# Patient Record
Sex: FEMALE | Race: OTHER | Employment: FULL TIME | URBAN - METROPOLITAN AREA
[De-identification: names, ages, dates, MRNs, and addresses within clinical notes are randomized per-mention and may not be internally consistent; named-entity substitution may affect disease eponyms.]

---

## 2019-02-11 LAB
ANTIBODY SCREEN, EXTERNAL: NEGATIVE
CHLAMYDIA, EXTERNAL: NEGATIVE
HBSAG, EXTERNAL: NEGATIVE
HIV, EXTERNAL: NEGATIVE
N. GONORRHEA, EXTERNAL: NEGATIVE
RPR, EXTERNAL: NON REACTIVE
RUBELLA, EXTERNAL: NORMAL
TYPE, ABO & RH, EXTERNAL: NORMAL

## 2019-09-03 LAB — GRBS, EXTERNAL: NEGATIVE

## 2019-09-24 ENCOUNTER — HOSPITAL ENCOUNTER (INPATIENT)
Age: 29
LOS: 4 days | Discharge: HOME | End: 2019-09-28
Attending: OBSTETRICS & GYNECOLOGY | Admitting: OBSTETRICS & GYNECOLOGY
Payer: COMMERCIAL

## 2019-09-24 ENCOUNTER — ANESTHESIA (OUTPATIENT)
Dept: LABOR AND DELIVERY | Age: 29
End: 2019-09-24
Payer: COMMERCIAL

## 2019-09-24 ENCOUNTER — ANESTHESIA EVENT (OUTPATIENT)
Dept: LABOR AND DELIVERY | Age: 29
End: 2019-09-24
Payer: COMMERCIAL

## 2019-09-24 PROBLEM — Z34.90 NORMAL IUP (INTRAUTERINE PREGNANCY) ON PRENATAL ULTRASOUND: Status: ACTIVE | Noted: 2019-09-24

## 2019-09-24 LAB
ABO + RH BLD: NORMAL
BASOPHILS # BLD: 0 K/UL (ref 0–0.1)
BASOPHILS NFR BLD: 0 % (ref 0–2)
BLOOD GROUP ANTIBODIES SERPL: NORMAL
DIFFERENTIAL METHOD BLD: ABNORMAL
EOSINOPHIL # BLD: 0.1 K/UL (ref 0–0.4)
EOSINOPHIL NFR BLD: 1 % (ref 0–5)
ERYTHROCYTE [DISTWIDTH] IN BLOOD BY AUTOMATED COUNT: 14.6 % (ref 11.6–14.5)
HCT VFR BLD AUTO: 38.2 % (ref 35–45)
HGB BLD-MCNC: 12.6 G/DL (ref 12–16)
LYMPHOCYTES # BLD: 2.2 K/UL (ref 0.9–3.6)
LYMPHOCYTES NFR BLD: 22 % (ref 21–52)
MCH RBC QN AUTO: 27.6 PG (ref 24–34)
MCHC RBC AUTO-ENTMCNC: 33 G/DL (ref 31–37)
MCV RBC AUTO: 83.6 FL (ref 74–97)
MONOCYTES # BLD: 0.6 K/UL (ref 0.05–1.2)
MONOCYTES NFR BLD: 6 % (ref 3–10)
NEUTS SEG # BLD: 7.1 K/UL (ref 1.8–8)
NEUTS SEG NFR BLD: 71 % (ref 40–73)
PLATELET # BLD AUTO: 228 K/UL (ref 135–420)
PMV BLD AUTO: 10.7 FL (ref 9.2–11.8)
RBC # BLD AUTO: 4.57 M/UL (ref 4.2–5.3)
SPECIMEN EXP DATE BLD: NORMAL
WBC # BLD AUTO: 9.9 K/UL (ref 4.6–13.2)

## 2019-09-24 PROCEDURE — 74011250636 HC RX REV CODE- 250/636

## 2019-09-24 PROCEDURE — 85025 COMPLETE CBC W/AUTO DIFF WBC: CPT

## 2019-09-24 PROCEDURE — 77030034849

## 2019-09-24 PROCEDURE — 75410000002 HC LABOR FEE PER 1 HR

## 2019-09-24 PROCEDURE — 65270000029 HC RM PRIVATE

## 2019-09-24 PROCEDURE — 4A1H74Z MONITORING OF PRODUCTS OF CONCEPTION, CARDIAC ELECTRICAL ACTIVITY, VIA NATURAL OR ARTIFICIAL OPENING: ICD-10-PCS | Performed by: OBSTETRICS & GYNECOLOGY

## 2019-09-24 PROCEDURE — 74011000250 HC RX REV CODE- 250

## 2019-09-24 PROCEDURE — 77030007879 HC KT SPN EPDRL TELE -B: Performed by: ANESTHESIOLOGY

## 2019-09-24 PROCEDURE — 74011250636 HC RX REV CODE- 250/636: Performed by: ANESTHESIOLOGY

## 2019-09-24 PROCEDURE — 86900 BLOOD TYPING SEROLOGIC ABO: CPT

## 2019-09-24 PROCEDURE — 74011000250 HC RX REV CODE- 250: Performed by: ANESTHESIOLOGY

## 2019-09-24 PROCEDURE — 74011250636 HC RX REV CODE- 250/636: Performed by: MIDWIFE

## 2019-09-24 RX ORDER — SODIUM CHLORIDE 0.9 % (FLUSH) 0.9 %
5-40 SYRINGE (ML) INJECTION AS NEEDED
Status: DISCONTINUED | OUTPATIENT
Start: 2019-09-24 | End: 2019-09-25 | Stop reason: HOSPADM

## 2019-09-24 RX ORDER — METHYLERGONOVINE MALEATE 0.2 MG/ML
0.2 INJECTION INTRAVENOUS AS NEEDED
Status: COMPLETED | OUTPATIENT
Start: 2019-09-24 | End: 2019-09-25

## 2019-09-24 RX ORDER — FENTANYL CITRATE 50 UG/ML
100 INJECTION, SOLUTION INTRAMUSCULAR; INTRAVENOUS ONCE
Status: COMPLETED | OUTPATIENT
Start: 2019-09-24 | End: 2019-09-24

## 2019-09-24 RX ORDER — ONDANSETRON 2 MG/ML
INJECTION INTRAMUSCULAR; INTRAVENOUS
Status: COMPLETED
Start: 2019-09-24 | End: 2019-09-24

## 2019-09-24 RX ORDER — BUTORPHANOL TARTRATE 2 MG/ML
2 INJECTION INTRAMUSCULAR; INTRAVENOUS
Status: DISCONTINUED | OUTPATIENT
Start: 2019-09-24 | End: 2019-09-25 | Stop reason: HOSPADM

## 2019-09-24 RX ORDER — NALBUPHINE HYDROCHLORIDE 10 MG/ML
10 INJECTION, SOLUTION INTRAMUSCULAR; INTRAVENOUS; SUBCUTANEOUS
Status: DISCONTINUED | OUTPATIENT
Start: 2019-09-24 | End: 2019-09-25 | Stop reason: HOSPADM

## 2019-09-24 RX ORDER — OXYTOCIN/0.9 % SODIUM CHLORIDE 30/500 ML
0-20 PLASTIC BAG, INJECTION (ML) INTRAVENOUS
Status: DISCONTINUED | OUTPATIENT
Start: 2019-09-24 | End: 2019-09-24

## 2019-09-24 RX ORDER — LIDOCAINE HYDROCHLORIDE 10 MG/ML
20 INJECTION, SOLUTION EPIDURAL; INFILTRATION; INTRACAUDAL; PERINEURAL AS NEEDED
Status: DISCONTINUED | OUTPATIENT
Start: 2019-09-24 | End: 2019-09-25 | Stop reason: HOSPADM

## 2019-09-24 RX ORDER — ONDANSETRON 2 MG/ML
4 INJECTION INTRAMUSCULAR; INTRAVENOUS
Status: DISCONTINUED | OUTPATIENT
Start: 2019-09-24 | End: 2019-09-25

## 2019-09-24 RX ORDER — OXYTOCIN/0.9 % SODIUM CHLORIDE 20/1000 ML
999 PLASTIC BAG, INJECTION (ML) INTRAVENOUS ONCE
Status: ACTIVE | OUTPATIENT
Start: 2019-09-24 | End: 2019-09-24

## 2019-09-24 RX ORDER — PHENYLEPHRINE HCL IN 0.9% NACL 1 MG/10 ML
80 SYRINGE (ML) INTRAVENOUS AS NEEDED
Status: DISCONTINUED | OUTPATIENT
Start: 2019-09-24 | End: 2019-09-25 | Stop reason: HOSPADM

## 2019-09-24 RX ORDER — TERBUTALINE SULFATE 1 MG/ML
0.25 INJECTION SUBCUTANEOUS
Status: DISCONTINUED | OUTPATIENT
Start: 2019-09-24 | End: 2019-09-25 | Stop reason: HOSPADM

## 2019-09-24 RX ORDER — NALBUPHINE HYDROCHLORIDE 10 MG/ML
2.5 INJECTION, SOLUTION INTRAMUSCULAR; INTRAVENOUS; SUBCUTANEOUS
Status: DISCONTINUED | OUTPATIENT
Start: 2019-09-24 | End: 2019-09-25 | Stop reason: HOSPADM

## 2019-09-24 RX ORDER — SODIUM CHLORIDE, SODIUM LACTATE, POTASSIUM CHLORIDE, CALCIUM CHLORIDE 600; 310; 30; 20 MG/100ML; MG/100ML; MG/100ML; MG/100ML
125 INJECTION, SOLUTION INTRAVENOUS CONTINUOUS
Status: DISCONTINUED | OUTPATIENT
Start: 2019-09-24 | End: 2019-09-25 | Stop reason: HOSPADM

## 2019-09-24 RX ORDER — OXYTOCIN/0.9 % SODIUM CHLORIDE 20/1000 ML
125 PLASTIC BAG, INJECTION (ML) INTRAVENOUS CONTINUOUS
Status: DISCONTINUED | OUTPATIENT
Start: 2019-09-24 | End: 2019-09-25 | Stop reason: HOSPADM

## 2019-09-24 RX ORDER — DIPHENHYDRAMINE HYDROCHLORIDE 50 MG/ML
25 INJECTION, SOLUTION INTRAMUSCULAR; INTRAVENOUS
Status: DISCONTINUED | OUTPATIENT
Start: 2019-09-24 | End: 2019-09-25 | Stop reason: HOSPADM

## 2019-09-24 RX ORDER — FENTANYL CITRATE 50 UG/ML
INJECTION, SOLUTION INTRAMUSCULAR; INTRAVENOUS AS NEEDED
Status: DISCONTINUED | OUTPATIENT
Start: 2019-09-24 | End: 2019-09-25 | Stop reason: HOSPADM

## 2019-09-24 RX ORDER — RANITIDINE 150 MG/1
150 TABLET, FILM COATED ORAL 2 TIMES DAILY
COMMUNITY

## 2019-09-24 RX ORDER — MINERAL OIL
30 OIL (ML) ORAL AS NEEDED
Status: DISCONTINUED | OUTPATIENT
Start: 2019-09-24 | End: 2019-09-25 | Stop reason: HOSPADM

## 2019-09-24 RX ORDER — LIDOCAINE HYDROCHLORIDE AND EPINEPHRINE 15; 5 MG/ML; UG/ML
INJECTION, SOLUTION EPIDURAL AS NEEDED
Status: DISCONTINUED | OUTPATIENT
Start: 2019-09-24 | End: 2019-09-25 | Stop reason: HOSPADM

## 2019-09-24 RX ORDER — FENTANYL/ROPIVACAINE/NS/PF 2MCG/ML-.1
1-22 PLASTIC BAG, INJECTION (ML) EPIDURAL
Status: DISCONTINUED | OUTPATIENT
Start: 2019-09-24 | End: 2019-09-25 | Stop reason: HOSPADM

## 2019-09-24 RX ORDER — SODIUM CHLORIDE 0.9 % (FLUSH) 0.9 %
5-40 SYRINGE (ML) INJECTION EVERY 8 HOURS
Status: DISCONTINUED | OUTPATIENT
Start: 2019-09-24 | End: 2019-09-25 | Stop reason: HOSPADM

## 2019-09-24 RX ORDER — NALOXONE HYDROCHLORIDE 0.4 MG/ML
0.2 INJECTION, SOLUTION INTRAMUSCULAR; INTRAVENOUS; SUBCUTANEOUS AS NEEDED
Status: DISCONTINUED | OUTPATIENT
Start: 2019-09-24 | End: 2019-09-25 | Stop reason: HOSPADM

## 2019-09-24 RX ORDER — OXYTOCIN/0.9 % SODIUM CHLORIDE 30/500 ML
0-20 PLASTIC BAG, INJECTION (ML) INTRAVENOUS
Status: DISCONTINUED | OUTPATIENT
Start: 2019-09-24 | End: 2019-09-25

## 2019-09-24 RX ADMIN — SODIUM CHLORIDE, SODIUM LACTATE, POTASSIUM CHLORIDE, AND CALCIUM CHLORIDE 125 ML/HR: 600; 310; 30; 20 INJECTION, SOLUTION INTRAVENOUS at 09:50

## 2019-09-24 RX ADMIN — SODIUM CHLORIDE, SODIUM LACTATE, POTASSIUM CHLORIDE, AND CALCIUM CHLORIDE 1000 ML: 600; 310; 30; 20 INJECTION, SOLUTION INTRAVENOUS at 12:42

## 2019-09-24 RX ADMIN — ONDANSETRON 4 MG: 2 INJECTION INTRAMUSCULAR; INTRAVENOUS at 17:39

## 2019-09-24 RX ADMIN — Medication 2 MILLI-UNITS/MIN: at 10:15

## 2019-09-24 RX ADMIN — Medication 6 MILLI-UNITS/MIN: at 11:27

## 2019-09-24 RX ADMIN — LIDOCAINE HYDROCHLORIDE AND EPINEPHRINE 5 ML: 15; 5 INJECTION, SOLUTION EPIDURAL at 14:06

## 2019-09-24 RX ADMIN — ROPIVACAINE HYDROCHLORIDE 15 ML/HR: 10 INJECTION, SOLUTION EPIDURAL at 12:52

## 2019-09-24 RX ADMIN — FENTANYL CITRATE 100 MCG: 50 INJECTION, SOLUTION INTRAMUSCULAR; INTRAVENOUS at 14:07

## 2019-09-24 RX ADMIN — FENTANYL CITRATE 100 MCG: 50 INJECTION, SOLUTION INTRAMUSCULAR; INTRAVENOUS at 12:49

## 2019-09-24 RX ADMIN — SODIUM CHLORIDE, SODIUM LACTATE, POTASSIUM CHLORIDE, AND CALCIUM CHLORIDE 125 ML/HR: 600; 310; 30; 20 INJECTION, SOLUTION INTRAVENOUS at 23:07

## 2019-09-24 RX ADMIN — SODIUM CHLORIDE, SODIUM LACTATE, POTASSIUM CHLORIDE, AND CALCIUM CHLORIDE 500 ML: 600; 310; 30; 20 INJECTION, SOLUTION INTRAVENOUS at 23:11

## 2019-09-24 RX ADMIN — LIDOCAINE HYDROCHLORIDE AND EPINEPHRINE 5 ML: 15; 5 INJECTION, SOLUTION EPIDURAL at 12:45

## 2019-09-24 NOTE — ANESTHESIA PREPROCEDURE EVALUATION
Relevant Problems   No relevant active problems       Anesthetic History               Review of Systems / Medical History  Patient summary reviewed, nursing notes reviewed and pertinent labs reviewed    Pulmonary            Asthma        Neuro/Psych   Within defined limits           Cardiovascular                  Exercise tolerance: >4 METS     GI/Hepatic/Renal  Within defined limits              Endo/Other  Within defined limits           Other Findings              Physical Exam    Airway  Mallampati: II  TM Distance: 4 - 6 cm  Neck ROM: normal range of motion   Mouth opening: Normal     Cardiovascular  Regular rate and rhythm,  S1 and S2 normal,  no murmur, click, rub, or gallop             Dental  No notable dental hx       Pulmonary  Breath sounds clear to auscultation               Abdominal  GI exam deferred       Other Findings            Anesthetic Plan    ASA: 3  Anesthesia type: epidural  Risk and benefits fully explained to the patient including bleeding, headache, nerve damage, infection, nausea, back pain, and hemodynamic changes. Patient understands and agrees to the procedure. Post-op pain plan if not by surgeon: indwelling epidural catheter      Anesthetic plan and risks discussed with: Patient      Dr. Rima Mccauley called c/s. Patient seen and ready for surgery. SS Morphine discussed. Will use epidural for c/s.

## 2019-09-24 NOTE — PROGRESS NOTES
presents at 39.0 weeks gestation for induction due to suspected LGA infant. EFM applied. Hx taken. Pt oriented to room. Consents signed.

## 2019-09-24 NOTE — PROGRESS NOTES
09/24/19 1346 09/24/19 1351   Maternal Vital Signs   O2 Sat (%) 95 % 99 %   Pulse via Oximetry 86 beats per minute 104 beats per minute   BP  --  (!) 69/34   MAP (Calculated)  --  (!) 46     Dr. Kelly Beard called to pt's bedside.

## 2019-09-24 NOTE — ANESTHESIA PROCEDURE NOTES
Epidural Block    Start time: 9/24/2019 12:46 PM  End time: 9/24/2019 12:51 PM  Performed by: Berkley Savage MD  Authorized by:  Berkley Savage MD     Pre-Procedure  Indication: at surgeon's request, post-op pain management, procedure for pain and labor epidural    Preanesthetic Checklist: patient identified, risks and benefits discussed, anesthesia consent, site marked, patient being monitored, timeout performed and anesthesia consent      Epidural:   Patient position:  Seated  Prep region:  Lumbar  Prep: Chlorhexidine    Location:  L3-4    Needle and Epidural Catheter:   Needle Type:  Tuohy  Needle Gauge:  17 G  Injection Technique:  Loss of resistance using saline  Attempts:  1  Catheter Size:  20 G  Catheter at Skin Depth (cm):  12  Depth in Epidural Space (cm):  5  Events: no blood with aspiration, no cerebrospinal fluid with aspiration, no paresthesia and negative aspiration test    Test Dose:  Negative    Assessment:   Catheter Secured:  Tegaderm and tape  Insertion:  Uncomplicated  Patient tolerance:  Patient tolerated the procedure well with no immediate complications

## 2019-09-24 NOTE — PROGRESS NOTES
Labor Progress Note  Patient seen, fetal heart rate and contraction pattern evaluated, patient examined. No data found. Physical Exam:  Cervical Exam:  4 cm dilated    50% effaced    -3 station    Membranes:  Intact  Uterine Activity: Frequency: Every 2-5 minutes, Duration: 60-80 seconds and Intensity: moderate  Fetal Heart Rate: Baseline: 125 per minute  Variability: moderate  Accelerations: yes  Decelerations: none    Assessment/Plan:  Reassuring Fetal Status, Cat 1 tracing  Comfortable with INDER; daniels catheter placed without difficulty by this CNM after vaginal exam.  Minimal change in SVE.  Plan to increase pitocin and attempt AROM with next exam.  Anticipate     Kristie Lassiter CNM

## 2019-09-24 NOTE — H&P
History & Physical    Name: Seema Kumari MRN: [id number]  SSN: [id number]    YOB: 1990  Age: 26 y.o. Sex: female        Subjective:     Estimated Date of Delivery: None noted. OB History        1    Para        Term                AB        Living           SAB        TAB        Ectopic        Molar        Multiple        Live Births                      Ms. Jere Carranza is admitted with pregnancy at Unknown for induction of labor. Prenatal course was normal. Please see prenatal records for details. Allergies not on file    Objective:     Vitals: There were no vitals filed for this visit. Physical Exam:  Patient without distress. Abdomen: soft, nontender  Fundus: soft and non tender  Perineum: blood absent, amniotic fluid absent  Cervical Exam: 4 cm dilated    10% effaced    -3 station    presenting part vtx  Membranes:  Intact  Fetal Heart Rate & Contraction pattern: Reactive    Prenatal Labs:   No results found for: RUBELLAEXT, GRBSEXT, HBSAGEXT, HIVEXT, RPREXT, GONNOEXT, CHLAMEXT      Assessment/Plan:     Plan: Admit for Reassuring fetal status. Plan for pitocin/AROM as indicated. INDER/IV pain meds per patient request. Group B Strep was negative.     Signed By:  Pepito Uribe CNM     2019

## 2019-09-24 NOTE — PROGRESS NOTES
Bedside shift change report given to KRISTIE Morrow RN (oncoming nurse) by [employer] (offgoing nurse). Report included the following information SBAR, Intake/Output and MAR.

## 2019-09-24 NOTE — PROGRESS NOTES
Dr. Shaquille Clements at bedside explaining epidural procedure, side effects, risks, benefits, and positioning. Patient verbalizes understanding. Time-out: 1246  Procedure start: 1313  Catheter in, needle out: 1249  Test dose: 1250  Fentanyl vial handed to MD at bedside.   Loading dose: 1251  Patient connected to pump: 1253

## 2019-09-25 PROBLEM — Z34.90 NORMAL IUP (INTRAUTERINE PREGNANCY) ON PRENATAL ULTRASOUND: Status: RESOLVED | Noted: 2019-09-24 | Resolved: 2019-09-25

## 2019-09-25 PROBLEM — E66.01 MORBID OBESITY WITH BMI OF 40.0-44.9, ADULT (HCC): Status: ACTIVE | Noted: 2019-09-25

## 2019-09-25 PROBLEM — Q65.89 CONGENITAL HIP DYSPLASIA: Status: ACTIVE | Noted: 2019-09-25

## 2019-09-25 PROCEDURE — 77030034849

## 2019-09-25 PROCEDURE — 74011250636 HC RX REV CODE- 250/636: Performed by: ADVANCED PRACTICE MIDWIFE

## 2019-09-25 PROCEDURE — 76060000078 HC EPIDURAL ANESTHESIA

## 2019-09-25 PROCEDURE — 74011250636 HC RX REV CODE- 250/636: Performed by: MIDWIFE

## 2019-09-25 PROCEDURE — 74011250637 HC RX REV CODE- 250/637: Performed by: ANESTHESIOLOGY

## 2019-09-25 PROCEDURE — 75410000002 HC LABOR FEE PER 1 HR

## 2019-09-25 PROCEDURE — 74011250636 HC RX REV CODE- 250/636

## 2019-09-25 PROCEDURE — 75410000003 HC RECOV DEL/VAG/CSECN EA 0.5 HR

## 2019-09-25 PROCEDURE — 74011000250 HC RX REV CODE- 250: Performed by: ANESTHESIOLOGY

## 2019-09-25 PROCEDURE — 77030031139 HC SUT VCRL2 J&J -A: Performed by: OBSTETRICS & GYNECOLOGY

## 2019-09-25 PROCEDURE — 77030002933 HC SUT MCRYL J&J -A: Performed by: OBSTETRICS & GYNECOLOGY

## 2019-09-25 PROCEDURE — 74011250636 HC RX REV CODE- 250/636: Performed by: ANESTHESIOLOGY

## 2019-09-25 PROCEDURE — 77030040361 HC SLV COMPR DVT MDII -B

## 2019-09-25 PROCEDURE — 76010000392 HC C SECN EA ADDL 0.5 HR: Performed by: OBSTETRICS & GYNECOLOGY

## 2019-09-25 PROCEDURE — 74011250637 HC RX REV CODE- 250/637

## 2019-09-25 PROCEDURE — 77030002888 HC SUT CHRMC J&J -A: Performed by: OBSTETRICS & GYNECOLOGY

## 2019-09-25 PROCEDURE — 76060000033 HC ANESTHESIA 1 TO 1.5 HR: Performed by: OBSTETRICS & GYNECOLOGY

## 2019-09-25 PROCEDURE — 75410000003 HC RECOV DEL/VAG/CSECN EA 0.5 HR: Performed by: OBSTETRICS & GYNECOLOGY

## 2019-09-25 PROCEDURE — 74011000250 HC RX REV CODE- 250: Performed by: ADVANCED PRACTICE MIDWIFE

## 2019-09-25 PROCEDURE — 65270000029 HC RM PRIVATE

## 2019-09-25 PROCEDURE — 76010000391 HC C SECN FIRST 1 HR: Performed by: OBSTETRICS & GYNECOLOGY

## 2019-09-25 PROCEDURE — 77030040361 HC SLV COMPR DVT MDII -B: Performed by: OBSTETRICS & GYNECOLOGY

## 2019-09-25 RX ORDER — SODIUM CHLORIDE, SODIUM LACTATE, POTASSIUM CHLORIDE, CALCIUM CHLORIDE 600; 310; 30; 20 MG/100ML; MG/100ML; MG/100ML; MG/100ML
100 INJECTION, SOLUTION INTRAVENOUS CONTINUOUS
Status: DISCONTINUED | OUTPATIENT
Start: 2019-09-25 | End: 2019-09-28 | Stop reason: HOSPADM

## 2019-09-25 RX ORDER — ACETAMINOPHEN 325 MG/1
650 TABLET ORAL
Status: DISCONTINUED | OUTPATIENT
Start: 2019-09-25 | End: 2019-09-28 | Stop reason: HOSPADM

## 2019-09-25 RX ORDER — FENTANYL CITRATE 50 UG/ML
25 INJECTION, SOLUTION INTRAMUSCULAR; INTRAVENOUS AS NEEDED
Status: DISCONTINUED | OUTPATIENT
Start: 2019-09-25 | End: 2019-09-28 | Stop reason: HOSPADM

## 2019-09-25 RX ORDER — OXYTOCIN/RINGER'S LACTATE 20/1000 ML
PLASTIC BAG, INJECTION (ML) INTRAVENOUS
Status: DISCONTINUED | OUTPATIENT
Start: 2019-09-25 | End: 2019-09-25 | Stop reason: HOSPADM

## 2019-09-25 RX ORDER — PROMETHAZINE HYDROCHLORIDE 25 MG/ML
25 INJECTION, SOLUTION INTRAMUSCULAR; INTRAVENOUS
Status: DISCONTINUED | OUTPATIENT
Start: 2019-09-25 | End: 2019-09-28 | Stop reason: HOSPADM

## 2019-09-25 RX ORDER — SODIUM CHLORIDE 0.9 % (FLUSH) 0.9 %
5-40 SYRINGE (ML) INJECTION EVERY 8 HOURS
Status: DISCONTINUED | OUTPATIENT
Start: 2019-09-25 | End: 2019-09-28 | Stop reason: HOSPADM

## 2019-09-25 RX ORDER — BUPIVACAINE HYDROCHLORIDE 2.5 MG/ML
INJECTION, SOLUTION EPIDURAL; INFILTRATION; INTRACAUDAL AS NEEDED
Status: DISCONTINUED | OUTPATIENT
Start: 2019-09-25 | End: 2019-09-25 | Stop reason: HOSPADM

## 2019-09-25 RX ORDER — HYDROMORPHONE HYDROCHLORIDE 1 MG/ML
INJECTION, SOLUTION INTRAMUSCULAR; INTRAVENOUS; SUBCUTANEOUS
Status: COMPLETED
Start: 2019-09-25 | End: 2019-09-25

## 2019-09-25 RX ORDER — LORATADINE 10 MG/1
10 TABLET ORAL DAILY PRN
Status: DISCONTINUED | OUTPATIENT
Start: 2019-09-25 | End: 2019-09-28 | Stop reason: HOSPADM

## 2019-09-25 RX ORDER — OXYCODONE HYDROCHLORIDE 5 MG/1
5 TABLET ORAL
Status: DISPENSED | OUTPATIENT
Start: 2019-09-25 | End: 2019-09-26

## 2019-09-25 RX ORDER — ONDANSETRON 2 MG/ML
INJECTION INTRAMUSCULAR; INTRAVENOUS AS NEEDED
Status: DISCONTINUED | OUTPATIENT
Start: 2019-09-25 | End: 2019-09-25 | Stop reason: HOSPADM

## 2019-09-25 RX ORDER — FACIAL-BODY WIPES
10 EACH TOPICAL
Status: DISCONTINUED | OUTPATIENT
Start: 2019-09-25 | End: 2019-09-28 | Stop reason: HOSPADM

## 2019-09-25 RX ORDER — OXYTOCIN/0.9 % SODIUM CHLORIDE 30/500 ML
0-20 PLASTIC BAG, INJECTION (ML) INTRAVENOUS
Status: DISCONTINUED | OUTPATIENT
Start: 2019-09-25 | End: 2019-09-25

## 2019-09-25 RX ORDER — MISOPROSTOL 100 UG/1
TABLET ORAL
Status: COMPLETED
Start: 2019-09-25 | End: 2019-09-25

## 2019-09-25 RX ORDER — CEFAZOLIN SODIUM/WATER 2 G/20 ML
2 SYRINGE (ML) INTRAVENOUS EVERY 8 HOURS
Status: DISCONTINUED | OUTPATIENT
Start: 2019-09-25 | End: 2019-09-25

## 2019-09-25 RX ORDER — SODIUM CHLORIDE, SODIUM LACTATE, POTASSIUM CHLORIDE, CALCIUM CHLORIDE 600; 310; 30; 20 MG/100ML; MG/100ML; MG/100ML; MG/100ML
125 INJECTION, SOLUTION INTRAVENOUS CONTINUOUS
Status: DISPENSED | OUTPATIENT
Start: 2019-09-25 | End: 2019-09-26

## 2019-09-25 RX ORDER — KETOROLAC TROMETHAMINE 30 MG/ML
30 INJECTION, SOLUTION INTRAMUSCULAR; INTRAVENOUS EVERY 6 HOURS
Status: COMPLETED | OUTPATIENT
Start: 2019-09-25 | End: 2019-09-26

## 2019-09-25 RX ORDER — CEFAZOLIN SODIUM IN 0.9 % NACL 2 G/100 ML
PLASTIC BAG, INJECTION (ML) INTRAVENOUS AS NEEDED
Status: DISCONTINUED | OUTPATIENT
Start: 2019-09-25 | End: 2019-09-25 | Stop reason: HOSPADM

## 2019-09-25 RX ORDER — INSULIN LISPRO 100 [IU]/ML
INJECTION, SOLUTION INTRAVENOUS; SUBCUTANEOUS ONCE
Status: CANCELLED | OUTPATIENT
Start: 2019-09-25 | End: 2019-09-25

## 2019-09-25 RX ORDER — MISOPROSTOL 100 UG/1
800 TABLET ORAL ONCE
Status: COMPLETED | OUTPATIENT
Start: 2019-09-25 | End: 2019-09-25

## 2019-09-25 RX ORDER — NALOXONE HYDROCHLORIDE 0.4 MG/ML
0.2 INJECTION, SOLUTION INTRAMUSCULAR; INTRAVENOUS; SUBCUTANEOUS
Status: ACTIVE | OUTPATIENT
Start: 2019-09-25 | End: 2019-09-26

## 2019-09-25 RX ORDER — SODIUM CHLORIDE 0.9 % (FLUSH) 0.9 %
5-40 SYRINGE (ML) INJECTION AS NEEDED
Status: DISCONTINUED | OUTPATIENT
Start: 2019-09-25 | End: 2019-09-25 | Stop reason: SDUPTHER

## 2019-09-25 RX ORDER — SIMETHICONE 80 MG
80 TABLET,CHEWABLE ORAL
Status: DISCONTINUED | OUTPATIENT
Start: 2019-09-25 | End: 2019-09-28 | Stop reason: HOSPADM

## 2019-09-25 RX ORDER — SODIUM CHLORIDE 0.9 % (FLUSH) 0.9 %
5-40 SYRINGE (ML) INJECTION AS NEEDED
Status: DISCONTINUED | OUTPATIENT
Start: 2019-09-25 | End: 2019-09-28 | Stop reason: HOSPADM

## 2019-09-25 RX ORDER — OXYTOCIN/0.9 % SODIUM CHLORIDE 20/1000 ML
125 PLASTIC BAG, INJECTION (ML) INTRAVENOUS CONTINUOUS
Status: DISCONTINUED | OUTPATIENT
Start: 2019-09-25 | End: 2019-09-28 | Stop reason: HOSPADM

## 2019-09-25 RX ORDER — ONDANSETRON 2 MG/ML
4 INJECTION INTRAMUSCULAR; INTRAVENOUS ONCE
Status: ACTIVE | OUTPATIENT
Start: 2019-09-25 | End: 2019-09-25

## 2019-09-25 RX ORDER — MAGNESIUM SULFATE 100 %
4 CRYSTALS MISCELLANEOUS AS NEEDED
Status: CANCELLED | OUTPATIENT
Start: 2019-09-25

## 2019-09-25 RX ORDER — EPHEDRINE SULFATE/0.9% NACL/PF 25 MG/5 ML
SYRINGE (ML) INTRAVENOUS AS NEEDED
Status: DISCONTINUED | OUTPATIENT
Start: 2019-09-25 | End: 2019-09-25 | Stop reason: HOSPADM

## 2019-09-25 RX ORDER — OXYCODONE AND ACETAMINOPHEN 5; 325 MG/1; MG/1
1-2 TABLET ORAL
Status: DISCONTINUED | OUTPATIENT
Start: 2019-09-25 | End: 2019-09-28 | Stop reason: HOSPADM

## 2019-09-25 RX ORDER — NALBUPHINE HYDROCHLORIDE 10 MG/ML
5 INJECTION, SOLUTION INTRAMUSCULAR; INTRAVENOUS; SUBCUTANEOUS
Status: ACTIVE | OUTPATIENT
Start: 2019-09-25 | End: 2019-09-26

## 2019-09-25 RX ORDER — MORPHINE SULFATE 1 MG/ML
INJECTION, SOLUTION EPIDURAL; INTRATHECAL; INTRAVENOUS AS NEEDED
Status: DISCONTINUED | OUTPATIENT
Start: 2019-09-25 | End: 2019-09-25 | Stop reason: HOSPADM

## 2019-09-25 RX ORDER — NALOXONE HYDROCHLORIDE 0.4 MG/ML
0.1 INJECTION, SOLUTION INTRAMUSCULAR; INTRAVENOUS; SUBCUTANEOUS AS NEEDED
Status: DISCONTINUED | OUTPATIENT
Start: 2019-09-25 | End: 2019-09-28 | Stop reason: HOSPADM

## 2019-09-25 RX ORDER — SODIUM CHLORIDE 0.9 % (FLUSH) 0.9 %
5-40 SYRINGE (ML) INJECTION EVERY 8 HOURS
Status: DISCONTINUED | OUTPATIENT
Start: 2019-09-25 | End: 2019-09-25 | Stop reason: SDUPTHER

## 2019-09-25 RX ORDER — IBUPROFEN 400 MG/1
800 TABLET ORAL
Status: DISCONTINUED | OUTPATIENT
Start: 2019-09-28 | End: 2019-09-26

## 2019-09-25 RX ORDER — NALOXONE HYDROCHLORIDE 0.4 MG/ML
0.4 INJECTION, SOLUTION INTRAMUSCULAR; INTRAVENOUS; SUBCUTANEOUS
Status: ACTIVE | OUTPATIENT
Start: 2019-09-25 | End: 2019-09-26

## 2019-09-25 RX ORDER — LIDOCAINE HYDROCHLORIDE AND EPINEPHRINE 20; 5 MG/ML; UG/ML
INJECTION, SOLUTION EPIDURAL; INFILTRATION; INTRACAUDAL; PERINEURAL AS NEEDED
Status: DISCONTINUED | OUTPATIENT
Start: 2019-09-25 | End: 2019-09-25 | Stop reason: HOSPADM

## 2019-09-25 RX ORDER — ONDANSETRON 2 MG/ML
4 INJECTION INTRAMUSCULAR; INTRAVENOUS
Status: DISCONTINUED | OUTPATIENT
Start: 2019-09-25 | End: 2019-09-28 | Stop reason: HOSPADM

## 2019-09-25 RX ORDER — ZOLPIDEM TARTRATE 5 MG/1
5 TABLET ORAL
Status: DISCONTINUED | OUTPATIENT
Start: 2019-09-25 | End: 2019-09-28 | Stop reason: HOSPADM

## 2019-09-25 RX ORDER — HYDROMORPHONE HYDROCHLORIDE 1 MG/ML
0.5 INJECTION, SOLUTION INTRAMUSCULAR; INTRAVENOUS; SUBCUTANEOUS
Status: DISCONTINUED | OUTPATIENT
Start: 2019-09-25 | End: 2019-09-28 | Stop reason: HOSPADM

## 2019-09-25 RX ORDER — DIPHENHYDRAMINE HYDROCHLORIDE 50 MG/ML
25 INJECTION, SOLUTION INTRAMUSCULAR; INTRAVENOUS
Status: DISCONTINUED | OUTPATIENT
Start: 2019-09-25 | End: 2019-09-28 | Stop reason: HOSPADM

## 2019-09-25 RX ORDER — CEFAZOLIN SODIUM/WATER 2 G/20 ML
2 SYRINGE (ML) INTRAVENOUS ONCE
Status: DISCONTINUED | OUTPATIENT
Start: 2019-09-25 | End: 2019-09-25

## 2019-09-25 RX ADMIN — KETOROLAC TROMETHAMINE 30 MG: 30 INJECTION, SOLUTION INTRAMUSCULAR at 17:40

## 2019-09-25 RX ADMIN — ROPIVACAINE HYDROCHLORIDE 12 ML/HR: 10 INJECTION, SOLUTION EPIDURAL at 06:22

## 2019-09-25 RX ADMIN — Medication 5 MG: at 11:05

## 2019-09-25 RX ADMIN — ONDANSETRON 4 MG: 2 INJECTION INTRAMUSCULAR; INTRAVENOUS at 10:22

## 2019-09-25 RX ADMIN — LIDOCAINE HYDROCHLORIDE AND EPINEPHRINE 5 ML: 20; 5 INJECTION, SOLUTION EPIDURAL; INFILTRATION; INTRACAUDAL; PERINEURAL at 10:07

## 2019-09-25 RX ADMIN — OXYCODONE HYDROCHLORIDE 5 MG: 5 TABLET ORAL at 22:20

## 2019-09-25 RX ADMIN — Medication 2000 ML/HR: at 10:41

## 2019-09-25 RX ADMIN — SODIUM CHLORIDE, SODIUM LACTATE, POTASSIUM CHLORIDE, AND CALCIUM CHLORIDE 300 ML: 600; 310; 30; 20 INJECTION, SOLUTION INTRAVENOUS at 09:08

## 2019-09-25 RX ADMIN — SODIUM CHLORIDE, SODIUM LACTATE, POTASSIUM CHLORIDE, AND CALCIUM CHLORIDE 125 ML/HR: 600; 310; 30; 20 INJECTION, SOLUTION INTRAVENOUS at 03:44

## 2019-09-25 RX ADMIN — ONDANSETRON 4 MG: 2 INJECTION INTRAMUSCULAR; INTRAVENOUS at 16:24

## 2019-09-25 RX ADMIN — MORPHINE SULFATE 4 MG: 1 INJECTION, SOLUTION EPIDURAL; INTRATHECAL; INTRAVENOUS at 11:21

## 2019-09-25 RX ADMIN — LIDOCAINE HYDROCHLORIDE AND EPINEPHRINE 5 ML: 20; 5 INJECTION, SOLUTION EPIDURAL; INFILTRATION; INTRACAUDAL; PERINEURAL at 09:46

## 2019-09-25 RX ADMIN — BUPIVACAINE HYDROCHLORIDE 6 ML: 2.5 INJECTION, SOLUTION EPIDURAL; INFILTRATION; INTRACAUDAL at 08:16

## 2019-09-25 RX ADMIN — LORATADINE 10 MG: 10 TABLET ORAL at 16:00

## 2019-09-25 RX ADMIN — FAMOTIDINE 20 MG: 10 INJECTION, SOLUTION INTRAVENOUS at 09:07

## 2019-09-25 RX ADMIN — MISOPROSTOL 800 MCG: 100 TABLET ORAL at 11:24

## 2019-09-25 RX ADMIN — LIDOCAINE HYDROCHLORIDE AND EPINEPHRINE 5 ML: 20; 5 INJECTION, SOLUTION EPIDURAL; INFILTRATION; INTRACAUDAL; PERINEURAL at 10:16

## 2019-09-25 RX ADMIN — METHYLERGONOVINE MALEATE 0.2 MCG: 0.2 INJECTION, SOLUTION INTRAMUSCULAR; INTRAVENOUS at 10:44

## 2019-09-25 RX ADMIN — HYDROMORPHONE HYDROCHLORIDE 0.5 MG: 1 INJECTION, SOLUTION INTRAMUSCULAR; INTRAVENOUS; SUBCUTANEOUS at 12:10

## 2019-09-25 RX ADMIN — Medication 2 G: at 10:17

## 2019-09-25 RX ADMIN — ROPIVACAINE HYDROCHLORIDE 12 ML/HR: 10 INJECTION, SOLUTION EPIDURAL at 00:26

## 2019-09-25 RX ADMIN — HYDROMORPHONE HYDROCHLORIDE 0.5 MG: 1 INJECTION, SOLUTION INTRAMUSCULAR; INTRAVENOUS; SUBCUTANEOUS at 15:50

## 2019-09-25 RX ADMIN — Medication 10 MG: at 11:02

## 2019-09-25 RX ADMIN — Medication 18 MILLI-UNITS/MIN: at 07:16

## 2019-09-25 RX ADMIN — Medication 200 ML/HR: at 11:12

## 2019-09-25 RX ADMIN — KETOROLAC TROMETHAMINE 30 MG: 30 INJECTION, SOLUTION INTRAMUSCULAR at 12:09

## 2019-09-25 RX ADMIN — Medication 2 G: at 10:00

## 2019-09-25 NOTE — PROGRESS NOTES
Labor Progress Note  Patient seen sitting upright in bed with INDER in place. IV Pitocin infusing at rate of 18 mu/min, fetal heart rate and contraction pattern evaluated, patient examined. Patient Vitals for the past 1 hrs:   BP Temp Pulse Resp SpO2   09/24/19 2019 -- -- -- -- 94 %   09/24/19 2016 (!) 89/70 98.6 °F (37 °C) 96 16 --       Physical Exam:  Cervical Exam:  4-5 cm dilated    50% effaced    -3 station , ballotable   Presenting Part: cephalic  Cervical Position: posterior  Consistency: Soft  Membranes:  Intact  Uterine Activity: Frequency: Every 1-3 minutes and Intensity: moderate  Fetal Heart Rate: Reactive, Category 1  Baseline: 120 per minute  Variability: moderate  Accelerations: yes  Decelerations: none    Assessment/Plan:  Reassuring fetal status, Labor  Progressing, Continue labor management and safe titration of IV pitocin as ordered. Plan ROM when fetal head is engaged. Encourage maternal change in position to assist with labor progression.  Anticipate plan for vaginal delivery

## 2019-09-25 NOTE — PROGRESS NOTES
Bedside and Verbal shift change report given to KRISTIE Morrow RN (oncoming nurse) by Bridgett Henry (offgoing nurse). Report included the following information SBAR, Kardex, Procedure Summary, Intake/Output, MAR and Recent Results. 2000 Pt assisted to high velazquez's position. 2030 JODIE Michael CNM at bedside. SVE 5/50/ballotable    2048 Pt is feeling more pain. Epidural rate has been reduced from 15 to 10 lisa-units due to hypotension. CRNA paged to see if we can increase epidural rate back to 15.     2054 Anesthesiologist paged with quick return call. Informed him of information above. Orders received to change order to 12/4/15/4.     2100 Upon changing the pump RN noticed that Epidural got disconnected at the filter side. Dr. Jake Bond paged and came to bedside. Filter taken off and blue part replaced before epidural reconnected. Pt in high velazquez's position. 2130 Pt assisted to left lateral side with leg in stirrup. 26 Pt assisted to right lateral side with leg in stirrup. [place]i readjusted. 2240 [place] readjusted. 200 Pt assisted to high velazquez's position. PT c/o nausea, light headedness. Late deceleration noted. 2312 PT assisted to left lateral side. Sheridan Card on unit. Informed her of deceleration, drop in BP, contractions that are spreading out. Sheridan Card CNM to come to bedside to check SVE.     2335 JODIE Michael RN at bedside. SVE  Unchanged. Orders received to stop Pitocin for 2 hours and restart at 4 lisa-units. Pt may eat a light meal at this time. 0130 Pt assisted to right lateral side. 0230 Pt sleeping on right lateral side. 1066 PT assisted to left lateral side with peanut ball between legs. 36 JODIE Michael CNM at bedside. SVE 5/50/-3 ballotable  . Baby continues to be balotable. CNM to consult with Dr. Damion Huynh about POC.     0744 Plan to continue with Pitocin.      0710 Bedside and Verbal shift change report given to Bridgett Henry (oncoming nurse) by King Orozco (offgoing nurse). Report included the following information SBAR, Kardex, Procedure Summary, Intake/Output, MAR and Recent Results.

## 2019-09-25 NOTE — PROGRESS NOTES
C/w cnm  Craigen Brault, 9 hrs + arrest of active labor, non descent, cvx still 5 cm  Over 9 hrs of pit augmentation, arm w/o change and vtx at -3station. , c/w couple all in concurence for LTCS delivery. Declines further labor  Manipulations. 1 hr called c section. Note late decelerations with last pit activity  Stopped once pit dced and labor decreased.

## 2019-09-25 NOTE — OP NOTES
Section Delivery Procedure Note    Name: Orestes Valdez   Medical Record Number: [id number]   YOB: 1990  Today's Date: 2019      Preoperative Diagnosis: Arrest of labor, IUP, 39 weeks gestation, bmi 37    Postoperative Diagnosis: Arrest of labor, IUP, 39 weeks gestation    Procedure: Low Cervical Transverse Procedure(s):   SECTION    Surgeon(s):  Maday Frank MD    Anesthesia: Epidural    Assistants: Circ-1: Kumar Louie  Scrub Tech-1: Jewels Schmid  Scrub Tech-2: Rozina Mcwilliams    Assistants Tasks:  Retraction      Prenatal Labs:   Lab Results   Component Value Date/Time    ABO/Rh(D) A POSITIVE 2019 08:58 AM    HBsAg, External negative 2019    HIV, External negative 2019    Rubella, External immune 2019    RPR, External non reactive 2019    Gonorrhea, External negative 2019    Chlamydia, External negative 2019    GrBStrep, External negative 2019        Prophylactic Antibiotics: Ancef pre-op Ancef pre-delivery 1 doses. Procedure Details:    See dictation.  Ticket number [id number]      Estimated Blood Loss: 700 ml   Replacement: o    Fetal Description: dove male    Apgar - One Minute: 8    Apgar - Five Minutes: 9    Umbilical Cord: 3 vessels present             Cord Blood Results:   Information for the patient's :  Moises Mckeon [[id number]]   No results found for: PCTABR, PCTDIG, BILI, ABORH         Birth Information:   Information for the patient's :  Moises Mckeon [[id number]]          Placenta:  manual removal    Specimens: * No specimens in log *       Maternal Findings    Uterus Size: enlarged, Fibroids: no , Adhesions: {None, Anomalies: None Defects: no   Tubes normal   Ovaries normal   Abdomen Adhesions: None   Pelvis Adhesions: None            Complications:  none     Drains:  daniels       Birth Weight: 9#8Oz        Mother's Condition: good  Baby's Condition: good    Signed: Virginia Segal MD      September 25, 2019

## 2019-09-25 NOTE — PROGRESS NOTES
Labor Progress Note  Patient seen, fetal heart rate and contraction pattern evaluated, patient examined. Pt tearful. States she is at her breaking point. Explained that often labor does not start until after AROM. States she understands but in a lot of pain. Anesthesia unable to fully dose epidural due to low bp. No data found. Physical Exam:  Cervical Exam:  /-3  Membranes:  leaking clear fluid  Uterine Activity: Frequency: Every 6 minutes  Fetal Heart Rate: Baseline: 120 per minute, repetitive variables with contractions with return to baseline. Assessment/Plan:  Arrest of labor  Fetal intolerance of labor  Reassuring fetal status, Labor  Not progressing normally  titrating dosage safely, Dr. Eisenberg Joes notified of lack of labor progress, patient unwilling to continue with labor, and fetal heart changes. Pt given informed consent including risks and benefits of  section. Questions answered.      Jodee Gallegos CNM

## 2019-09-25 NOTE — PROGRESS NOTES
Labor Progress Note  Patient seen, comfortable with INDER in place but states she is tired, fetal heart rate and contraction pattern evaluated, patient examined. Discussed recommendation to stop IV Pitocin at this time which will allow patient to rest and eat a light meal. Patient and spouse in agreement with POC. No data found. Physical Exam:  Cervical Exam:  4 cm dilated    50% effaced    -3 station    Presenting Part: cephalic  Cervical Position: posterior  Consistency: Soft  Membranes:  Intact  Uterine Activity: Frequency: Every 1-5 minutes and Intensity: moderate  Fetal Heart Rate: Baseline: 1-5 per minute  Variability: moderate  Accelerations: yes  Decelerations: none    Assessment/Plan:  Reassuring fetal status, Cervix unchanged. Discontinue IV Pitocin now. Restart IV Pitocin in  2 hours at rate of 4 mu/min. Continue plan for vaginal delivery.

## 2019-09-25 NOTE — PROGRESS NOTES
Bedside shift change report given to DWAYNE De Luna (oncoming nurse) by Myla Elizabeth RN (offgoing nurse). Report included the following information SBAR, Intake/Output and MAR.

## 2019-09-25 NOTE — ANESTHESIA POSTPROCEDURE EVALUATION
Post-Anesthesia Evaluation and Assessment    Cardiovascular Function/Vital Signs  Visit Vitals  /61   Pulse 97   Temp 36.4 °C (97.6 °F)   Resp 12   Ht 5' (1.524 m)   Wt 101.2 kg (223 lb)   SpO2 94%   Breastfeeding? Unknown   BMI 43.55 kg/m²       Patient is status post Procedure(s):   SECTION. Nausea/Vomiting: Controlled. Postoperative hydration reviewed and adequate. Pain:  Pain Scale 1: Numeric (0 - 10) (19)  Pain Intensity 1: 2 (19)   Managed. Neurological Status:   Neuro (WDL): Within Defined Limits (19)   At baseline. Mental Status and Level of Consciousness: Arousable. Pulmonary Status:   O2 Device: Room air (19)   Adequate oxygenation and airway patent. Complications related to anesthesia: None    Post-anesthesia assessment completed. No concerns. Patient has met all discharge requirements.     Signed By: Wendy Barboza MD    2019

## 2019-09-25 NOTE — PROGRESS NOTES
TRANSFER - IN REPORT:    Verbal report received from Bindu Jay RN (name) on Michaela Gotti  being received from [facility] (unit) for routine progression of care      Report consisted of patients Situation, Background, Assessment and   Recommendations(SBAR). Information from the following report(s) SBAR, Intake/Output, MAR and Recent Results was reviewed with the receiving nurse. Opportunity for questions and clarification was provided. Assessment completed upon patients arrival to unit and care assumed. VSS. Oriented to room. SCD's in place and instructed on use. Instructed on use of IS and pt demonstrated use. 1550-c/o inc. Pain- dilaudid given by marina Murillo RN. 1605-pain med effective. VSS. 1810-nataly-care completed  Moran emptied. Denies needs. 1845-visitors in room. 1930-Bedside and Verbal shift change report given to VIJAYA Sarmiento RN  (oncoming nurse) by SANJUANA Reynoso LPN (offgoing nurse). Report given with SBAR, Kardex, Intake/Output, MAR and Recent Results.

## 2019-09-25 NOTE — PROGRESS NOTES
Labor Progress Note  Patient seen, fetal heart rate and contraction pattern evaluated, patient examined. Patient Vitals for the past 1 hrs:   Temp   09/25/19 0714 97.9 °F (36.6 °C)       Physical Exam:  Cervical Exam: 5/50/-3  Membranes:  Artificial Rupture of Membranes; Amniotic Fluid: small amount of clear fluid  Uterine Activity: Frequency: Every 3.5 minutes  Fetal Heart Rate: Reactive    Assessment/Plan:  Reassuring fetal status, Labor  Progressing normally, Continue plan for vaginal delivery   Yael Cervantes

## 2019-09-25 NOTE — LACTATION NOTE
Attempted to latch,  will take a 1-2 sucks then come off. Discussed with mom all the fluids mom rec'd before delivery. Per mom \"my nipples generally become very erect\". Encouraged mom to hand express at this time and spoon feed. Will return when paged. 2012 LC called to room.  Now  is asleep and not interested in latching

## 2019-09-25 NOTE — LACTATION NOTE
Infant able to latch and take a few sucks at a time. Hand expression education completed with mom and handout with spoon given for reinforcement. Showed how to feed infant expressed colostrum with spoon. Encouraged mom to attempt feeding with hunger cues. Mom educated on breastfeeding basics--hunger cues, feeding on demand, waking baby if baby sleeps too long between feeds, importance of skin to skin, positioning and latching, risk of pacifier use and supplemental feedings, and importance of rooming in--and use of log sheet. Mom also educated on benefits of breastfeeding for herself and baby. Mom verbalized understanding. No questions at this time.

## 2019-09-25 NOTE — PROGRESS NOTES
Duane Cordoba CNM at bedside for SVE. Pt tolerated well. Pt to left lateral recumbent position with peanut ball between knees.

## 2019-09-25 NOTE — PROGRESS NOTES
Anesthesia Epidural management  Called by L&D nurse due to patient c/o increasing pain with contractions. Pt had been comfortable up until now. Orders given to increase PCEA to 12/4/15/4. Shortly after order given,  L&D nurse noted that epidural had become disconnected between the filter and the hub. On arrival to bedside, I instructed the nurse to remove the filter to eliminate the exposed end from the pump side. I removed the hub and replaced with a new sterile hub. Pump resumed at 12/4/15/4.

## 2019-09-25 NOTE — PROGRESS NOTES
Nataly-care and daniels care provided. Chux and nataly-pads replaced. New gown given. Pt denies further needs at this time.

## 2019-09-25 NOTE — PROGRESS NOTES
TRANSFER - OUT REPORT:    Verbal report given to SANJUANA Reynoso LPN(name) on Jg Jaime  being transferred to [address] [place] [address]) for routine progression of care       Report consisted of patients Situation, Background, Assessment and   Recommendations(SBAR). Information from the following report(s) SBAR, Intake/Output and MAR was reviewed with the receiving nurse. Lines:   Peripheral IV 09/24/19 Left;Posterior Hand (Active)   Site Assessment Clean, dry, & intact 9/25/2019 10:00 AM   Phlebitis Assessment 0 9/25/2019 10:00 AM   Infiltration Assessment 0 9/25/2019 10:00 AM   Dressing Status Clean, dry, & intact 9/25/2019 10:00 AM   Dressing Type Tape;Transparent 9/25/2019 10:00 AM   Hub Color/Line Status Green 9/25/2019 10:00 AM   Action Taken Catheter retaped 9/24/2019  8:16 PM   Alcohol Cap Used Yes 9/25/2019 10:00 AM        Opportunity for questions and clarification was provided. Patient transported with:   Registered Nurse   Care of stable pt relieved at this time.

## 2019-09-25 NOTE — PROGRESS NOTES
Labor Progress Note  Patient seen and comfortable with INDER, Pitocin IV titrated to 12 mu/min. Amniotomy procedure reviewed, risks and benefits discussed. All questions addressed to family satisfaction. Fetal heart rate and contraction pattern evaluated, patient examined. Patient Vitals for the past 1 hrs:   BP Temp Pulse Resp SpO2   09/25/19 0546 -- -- -- -- 98 %   09/25/19 0541 -- -- -- -- 99 %   09/25/19 0536 -- -- -- -- 98 %   09/25/19 0531 97/47 98.3 °F (36.8 °C) 94 16 99 %   09/25/19 0526 -- -- -- -- 98 %   09/25/19 0521 -- -- -- -- 99 %   09/25/19 0516 -- -- -- -- 96 %   09/25/19 0511 -- -- -- -- 95 %   09/25/19 0506 -- -- -- -- 96 %   09/25/19 0501 (!) 87/63 -- 89 -- 95 %       Physical Exam:  Cervical Exam:  5 cm dilated    50% effaced    -3 station    Presenting Part: cephalic, ballotable  Cervical Position: anterior  Consistency: Soft  Membranes:  Intact  Uterine Activity: Frequency: Every 2-5 minutes and Intensity: moderate  Fetal Heart Rate: Reactive  Baseline: 130 per minute  Variability: moderate  Accelerations: yes  Decelerations: none    Assessment/Plan:  Reassuring fetal status, Labor progressing slowly,  Fetal head not engaged on exam and unable to safely ROM at this time. Consulted with Dr. Ольаг Muñoz and Dr. Ángel Muniz (on-coming OB/GYN). Will continue pitocin IOL at this time, titrating dosage safely. Dr. Ángel Muniz will reassess patient at 10 AM and attempt to safely ROM, if able.  Continue plan for vaginal delivery

## 2019-09-25 NOTE — PROGRESS NOTES
0- mom requests for baby bath to be done tomorrow morning after daniels is removed and she is able to ambulate on her own.

## 2019-09-26 LAB
HCT VFR BLD AUTO: 36.3 % (ref 35–45)
HGB BLD-MCNC: 11.8 G/DL (ref 12–16)

## 2019-09-26 PROCEDURE — 74011250636 HC RX REV CODE- 250/636: Performed by: ADVANCED PRACTICE MIDWIFE

## 2019-09-26 PROCEDURE — 74011250637 HC RX REV CODE- 250/637: Performed by: ADVANCED PRACTICE MIDWIFE

## 2019-09-26 PROCEDURE — 85018 HEMOGLOBIN: CPT

## 2019-09-26 PROCEDURE — 36415 COLL VENOUS BLD VENIPUNCTURE: CPT

## 2019-09-26 PROCEDURE — 65270000029 HC RM PRIVATE

## 2019-09-26 PROCEDURE — 85014 HEMATOCRIT: CPT

## 2019-09-26 RX ORDER — BISACODYL 5 MG
5 TABLET, DELAYED RELEASE (ENTERIC COATED) ORAL DAILY PRN
Status: DISCONTINUED | OUTPATIENT
Start: 2019-09-26 | End: 2019-09-27

## 2019-09-26 RX ORDER — KETOROLAC TROMETHAMINE 30 MG/ML
30 INJECTION, SOLUTION INTRAMUSCULAR; INTRAVENOUS EVERY 6 HOURS
Status: COMPLETED | OUTPATIENT
Start: 2019-09-26 | End: 2019-09-26

## 2019-09-26 RX ORDER — IBUPROFEN 400 MG/1
800 TABLET ORAL
Status: DISCONTINUED | OUTPATIENT
Start: 2019-09-26 | End: 2019-09-28 | Stop reason: HOSPADM

## 2019-09-26 RX ADMIN — KETOROLAC TROMETHAMINE 30 MG: 30 INJECTION, SOLUTION INTRAMUSCULAR at 00:29

## 2019-09-26 RX ADMIN — OXYCODONE HYDROCHLORIDE AND ACETAMINOPHEN 1 TABLET: 5; 325 TABLET ORAL at 17:06

## 2019-09-26 RX ADMIN — KETOROLAC TROMETHAMINE 30 MG: 30 INJECTION, SOLUTION INTRAMUSCULAR at 07:45

## 2019-09-26 RX ADMIN — KETOROLAC TROMETHAMINE 30 MG: 30 INJECTION, SOLUTION INTRAMUSCULAR at 15:25

## 2019-09-26 RX ADMIN — IBUPROFEN 800 MG: 400 TABLET, FILM COATED ORAL at 21:01

## 2019-09-26 RX ADMIN — OXYCODONE HYDROCHLORIDE AND ACETAMINOPHEN 2 TABLET: 5; 325 TABLET ORAL at 09:44

## 2019-09-26 RX ADMIN — OXYCODONE HYDROCHLORIDE AND ACETAMINOPHEN 2 TABLET: 5; 325 TABLET ORAL at 05:50

## 2019-09-26 RX ADMIN — BISACODYL 5 MG: 5 TABLET, COATED ORAL at 23:14

## 2019-09-26 NOTE — PROGRESS NOTES
0210 Emptied 600 mL from daniels collection bag. Tube not secured to thigh, no anchor in place to secure. Pt stated catheter site is \"burning and hurts really bad\". Observed multiple, clustered, fluid filled blisters on inner thigh, close to perineum of both legs. Swelling of perineum observed. Pt complains of left knee pain, painful perineum, and swollen feet. Juan David Mayr CNA at bedside. Notified S. Rashaun Dee RN of observation. [address] [facility] Carlos Ae. Rashaun Dee RN at bedside. Daniels removed by Vane Wynne RN. Pt assisted to sit on bedside before ambulation to bathroom for first void after daniels removal. Pt dependant on OLIVER Zee and CARLOS Mckeon while ambulating to restroom, stating that left leg is \"very weak and hard to move\". Pt requested multiple breaks during ambulation, verbalizing feeling of weakness. Pt sitting on toilet in attempts to void. Pt verbalized feelings of \"peeing fire\". Dianelys Moss RN present in restroom with pt. Linens changed. Pt requested to sit on toilet for \"a little longer\" to attempt to void. 0249 Pt unable to void and ambulated back in bed from restroom, dependant on OLIVER Mckeon, and KRISTIE Lagos. Pt states pain level 5/10 located at incision, \"catheter site\", and left knee after ambulation. Pt bed in lowest position, call bell within reach, FOB at bedside with baby swaddled in arms. Vane Wynne RN present at bedside.

## 2019-09-26 NOTE — PROGRESS NOTES
1915 Bedside and Verbal shift change report given to VIJAYA Dodson RN (oncoming nurse) by SANJUANA Reynoso RN (offgoing nurse). Report included the following information SBAR, Kardex, Intake/Output, MAR and Recent Results. 0244 pt has raised fluid filled lumps on thighs believed to be moisture related. Pt had a ice pack in place on abdomen. The lumps on her thighs were revealed when the daniels was being taken out. Explained to patient if ice is used to only use for 10-15 minutes at a time. Geovanna care explained in detail to mother no questiones at this time. Pt verbalized understanding. 0565 fluid filled lump on legs have resolved. No ice pack in place.

## 2019-09-26 NOTE — PROGRESS NOTES
Assumed care of pt.  0815-assessment completed. Ambulated to bathroom with assistance x2. Ambulated slowly. Voided 1000 ml without diff. Geovanna-care completed. Assisted pt to bedside chair at 8:45.  0944-pain med given. Assisted pt back to bed. 1040-asleep in bed. Denies needs. 1110-assisted pt to readjust in bed. Denies needs. 1215-assisted pt to bathroom. Voided without diff. Geovanna-care completed. Assisted to chair. 1400-assisted pt back to bed. 1500-assisted pt to bathroom. Voided without diff. 1700-assessment completed. Denies needs. 1915-Bedside and Verbal shift change report given to MEG Renee RN  (oncoming nurse) by SANJUANA Reynoso LPN (offgoing nurse). Report given with SBAR, Kardex, Intake/Output, MAR and Recent Results.

## 2019-09-26 NOTE — LACTATION NOTE
Per mom, baby still not latching well, but is asleep currently. Will return to assist with feeding in 1-1.5 hours. 1045 Patient asleep when LC entered, but woke up and asked if LC could return. 46 Baby still unable to latch even with hand expressing. Mom very tearful that infant still hasn't latched and states, \"I'm failing him. \" Nipple shield used and infant latched and nursing well.

## 2019-09-26 NOTE — PROGRESS NOTES
Anesthesia: Post-op Duramorph Rounds      Pt reported good post-op analgesia from  Duramorph. No complaints of back pain or headache from neuraxial block  No complaints of residual motor or sensory deficits. No apparent anesthetic complications.      Genie Chung MD

## 2019-09-26 NOTE — PROGRESS NOTES
Progress Note                               Patient: Marina Hernandes MRN: [id number]  SSN: [id number]    YOB: 1990  Age: 26 y.o. Sex: female      1 Day Post-Op     Subjective:     Patient doing well postpartum without significant complaints. Voiding without difficulty. Patient reports normal lochia. . Breastfeeding: Yes     Objective:     Patient Vitals for the past 18 hrs:   Temp Pulse Resp BP SpO2   19 0551 98.2 °F (36.8 °C) 86 18 108/64 96 %   19 0207 98.3 °F (36.8 °C) 90 20 107/69 98 %   19 2008 98.6 °F (37 °C) 83 19 104/62 98 %   19 1606 98.9 °F (37.2 °C) 84 16 129/74 98 %   19 1600 -- 82 -- -- 96 %   19 1550 98.9 °F (37.2 °C) 80 16 138/86 97 %       Temp (24hrs), Av.4 °F (36.9 °C), Min:97.6 °F (36.4 °C), Max:99.1 °F (37.3 °C)      Physical Exam:    General:   Patient without distress. Abdomen: Soft, fundus firm at level of umbilicus, nontender   Incision: Clean, dry, and intact without erythema   Lower Extremities: Negative for swelling, cords, or tenderness        Lab/Data Review: All lab results for the last 24 hours reviewed. Assessment and Plan:     Patient appears to be having an uncomplicated post- course. Continue routine postoperative care and maternal education.

## 2019-09-26 NOTE — PROGRESS NOTES
1915- Bedside report received from SANJUANA Reynoso LPN. Pt. Stable. Needs addressed. Callbell within reach. 2101- Pt rated pain 5/10. Educated on pain management, Pain medication administered as ordered. Whiteboard updated. 0000- Pt joined infant in nursery for bath. UAL. Steady gait. 0030- INT removed per pt request. Pt up to shower. Removed dressing to incision. 0124- Pt. Joined at bedside by significant other and baby. AAOx4. Vital signs stable. Will continue to monitor. Pain 7/10. Educated on pain management. Pain medication administered as ordered. Whiteboard updated. Educated on plan of care and signs and symptoms to report. No further questions on concerns at this time. Fundus firm at U U, small rubra lochia. No clots noted. Incision DARIN, well approximated. Assessment complete. Callbell within reach. Bed in lowest position. 0402- pt up to bathroom. Needs and concerns addressed. 0510- Pt rated pain 4/10 >apin medication administered as ordered. Assisted with breastfeeding. 0720- Bedside and Verbal shift change report given to ERIKA Howard  (oncoming nurse) by MEG Chanel (offgoing nurse). Report included the following information SBAR, Kardex, Intake/Output, MAR and Recent Results.

## 2019-09-26 NOTE — PROGRESS NOTES
1915 Bedside and Verbal shift change report given to VIJAYA Kamara RN (oncoming nurse) by SANJUANA Reynoso RN (offgoing nurse). Report included the following information SBAR, Kardex, Intake/Output, MAR and Recent Results.

## 2019-09-27 PROCEDURE — 74011250637 HC RX REV CODE- 250/637: Performed by: ADVANCED PRACTICE MIDWIFE

## 2019-09-27 PROCEDURE — 65270000029 HC RM PRIVATE

## 2019-09-27 RX ORDER — SERTRALINE HYDROCHLORIDE 25 MG/1
25 TABLET, FILM COATED ORAL DAILY
Qty: 30 TAB | Refills: 3 | Status: SHIPPED | OUTPATIENT
Start: 2019-09-27

## 2019-09-27 RX ORDER — DOCUSATE SODIUM 100 MG/1
100 CAPSULE, LIQUID FILLED ORAL
Qty: 60 CAP | Refills: 0 | Status: SHIPPED | OUTPATIENT
Start: 2019-09-27

## 2019-09-27 RX ORDER — DOCUSATE SODIUM 100 MG/1
100 CAPSULE, LIQUID FILLED ORAL
Status: DISCONTINUED | OUTPATIENT
Start: 2019-09-27 | End: 2019-09-28 | Stop reason: HOSPADM

## 2019-09-27 RX ORDER — IBUPROFEN 800 MG/1
800 TABLET ORAL
Qty: 60 TAB | Refills: 0 | Status: SHIPPED | OUTPATIENT
Start: 2019-09-27

## 2019-09-27 RX ORDER — SERTRALINE HYDROCHLORIDE 50 MG/1
25 TABLET, FILM COATED ORAL DAILY
Status: DISCONTINUED | OUTPATIENT
Start: 2019-09-27 | End: 2019-09-28 | Stop reason: HOSPADM

## 2019-09-27 RX ORDER — OXYCODONE AND ACETAMINOPHEN 5; 325 MG/1; MG/1
1-2 TABLET ORAL
Qty: 20 TAB | Refills: 0 | Status: SHIPPED | OUTPATIENT
Start: 2019-09-27 | End: 2019-10-02

## 2019-09-27 RX ADMIN — OXYCODONE HYDROCHLORIDE AND ACETAMINOPHEN 2 TABLET: 5; 325 TABLET ORAL at 01:28

## 2019-09-27 RX ADMIN — IBUPROFEN 800 MG: 400 TABLET, FILM COATED ORAL at 13:22

## 2019-09-27 RX ADMIN — DOCUSATE SODIUM 100 MG: 100 CAPSULE, LIQUID FILLED ORAL at 12:03

## 2019-09-27 RX ADMIN — OXYCODONE HYDROCHLORIDE AND ACETAMINOPHEN 1 TABLET: 5; 325 TABLET ORAL at 12:03

## 2019-09-27 RX ADMIN — OXYCODONE HYDROCHLORIDE AND ACETAMINOPHEN 1 TABLET: 5; 325 TABLET ORAL at 07:41

## 2019-09-27 RX ADMIN — OXYCODONE HYDROCHLORIDE AND ACETAMINOPHEN 2 TABLET: 5; 325 TABLET ORAL at 20:26

## 2019-09-27 RX ADMIN — IBUPROFEN 800 MG: 400 TABLET, FILM COATED ORAL at 05:02

## 2019-09-27 RX ADMIN — IBUPROFEN 800 MG: 400 TABLET, FILM COATED ORAL at 20:26

## 2019-09-27 RX ADMIN — SERTRALINE HYDROCHLORIDE 25 MG: 50 TABLET ORAL at 12:03

## 2019-09-27 RX ADMIN — OXYCODONE HYDROCHLORIDE AND ACETAMINOPHEN 2 TABLET: 5; 325 TABLET ORAL at 16:20

## 2019-09-27 NOTE — DISCHARGE SUMMARY
Obstetrical Discharge Summary     Name: Selvin Bae MRN: [id number]  SSN: [id number]    YOB: 1990  Age: 26 y.o. Sex: female      Allergies: Patient has no known allergies. Admit Date: 2019    Discharge Date: 2019    Admitting Physician: France Santillan MD     Attending Physician:  Elizabeth Roy MD     * Admission Diagnoses: Normal IUP (intrauterine pregnancy) on prenatal ultrasound [Z34.90]    * Discharge Diagnoses:   Information for the patient's :  Antonietta Meza [[id number]]   Delivery of a 4.3 kg male infant via , Low Transverse on 2019 at 10:40 AM  by Navjot Kelly. Apgars were 8  and 9 . Additional Diagnoses:   Hospital Problems as of 2019 Date Reviewed: 2019          Codes Class Noted - Resolved POA    Postpartum care following  delivery ICD-10-CM: Z39.2  ICD-9-CM: V24.2  2019 - Present Clinically Undetermined        Congenital hip dysplasia ICD-10-CM: Q65.89  ICD-9-CM: 755.63  2019 - Present Yes        Morbid obesity with BMI of 40.0-44.9, [facility] ICD-10-CM: E66.01, Z68.41  ICD-9-CM: 278.01, V85.41  2019 - Present Yes        RESOLVED: Arrested active phase of labor ICD-10-CM: O62.1  ICD-9-CM: 661.10  2019 - 2019 No        RESOLVED: Normal IUP (intrauterine pregnancy) on prenatal ultrasound ICD-10-CM: Z34.90  ICD-9-CM: V22.2  2019 - 2019 Unknown             Lab Results   Component Value Date/Time    ABO/Rh(D) A POSITIVE 2019 08:58 AM    Rubella, External immune 2019    GrBStrep, External negative 2019    ABO,Rh A+ 2019      Immunization History   Administered Date(s) Administered    Influenza Vaccine 2019       * Procedures:   Procedure(s):   SECTION           * Discharge Condition: stable    * Hospital Course: Normal hospital course following the delivery.     * Disposition: Home    Discharge Medications:   Current Discharge Medication List      START taking these medications    Details   docusate sodium (COLACE) 100 mg capsule Take 1 Cap by mouth two (2) times daily as needed for Constipation. Qty: 60 Cap, Refills: 0    Associated Diagnoses: Postpartum care following  delivery      ibuprofen (MOTRIN) 800 mg tablet Take 1 Tab by mouth every eight (8) hours as needed for Pain. Qty: 60 Tab, Refills: 0    Associated Diagnoses: Postpartum care following  delivery      oxyCODONE-acetaminophen (PERCOCET) 5-325 mg per tablet Take 1-2 Tabs by mouth every four (4) hours as needed for Pain for up to 5 days. Max Daily Amount: 12 Tabs. Qty: 20 Tab, Refills: 0    Associated Diagnoses: Postpartum care following  delivery      sertraline (ZOLOFT) 25 mg tablet Take 1 Tab by mouth daily. Indications: Repeated Episodes of Anxiety  Qty: 30 Tab, Refills: 3         CONTINUE these medications which have NOT CHANGED    Details   PNV No12-Iron-FA-DSS-OM-3 29 mg iron-1 mg -50 mg CPKD Take  by mouth. raNITIdine (ZANTAC) 150 mg tablet Take 150 mg by mouth two (2) times a day. * Follow-up Care/Patient Instructions: Activity: Activity as tolerated, No heavy lifting, or strenuous exercise, pushing, pulling and No sex for 6 weeks. No driving while on analgesics  Diet: Regular Diet  Wound Care: Keep wound clean and dry and As directed    Follow-up Information     Follow up With Specialties Details Why Contact Info    None    None ([phone]) Patient stated that they have no PCP      Corina Smart MD Obstetrics & Gynecology, Gynecology, Obstetrics In 6 weeks Schedule follow appointment with our office in 6 weeks, call office as needed.  [address]  [phone]

## 2019-09-27 NOTE — LACTATION NOTE
Patient in bathroom, will return. 56 Per mom, just finished nursing infant for 10 minutes with nipple shield. Breastfeeding discharge teaching completed to include feeding on demand, foremilk and hindmilk importance, engorgement, mastitis, clogged ducts, pumping, breastmilk storage, and returning to work. Information given about unit and office phone numbers and encouraged mom to reach out if concerns arise, but that [facility] would be calling her in the next few days to follow up on breastfeeding. Mom verbalized understanding and no questions at this time.

## 2019-09-27 NOTE — PROGRESS NOTES
Bedside shift change report given to Tutu Orellana RN (oncoming nurse) by ERIKA Abrams (offgoing nurse). Report included the following information SBAR, Kardex, MAR and Recent Results.

## 2019-09-27 NOTE — PROGRESS NOTES
1900 Bedside shift change report given to Alf Crook (oncoming nurse) by Anthony Vallejo RN (offgoing nurse). Report included the following information SBAR, Kardex, Intake/Output, MAR and Recent Results. 2030 Assessment complete. Fundus firm at U-1, scant lochia rubra, no clots present. Pain 5/10, medication administered per protocol. VSS. Pt denies headache, visual disturbances, ringing in the ears, SOB, chest pain, shooting pain in calves. Pt educated on signs and symptoms to report, pt verbalized understanding. No needs expressed at this time. Bed in lowest position, call bell in reach. Pt up to bathroom now. 2130 Notified by FOB that pt was up walking on unit and is feeling dizzy. Upon walking to pt, pt was standing against the wall with baby in bassinet at her side. Pt transported back to room via wheelchair. Infant transported supine in bassinet back to rooming in with parents. Pt assisted back to bed. Pt educated to arise slowly from bed and to only walk on postpartum [address] close to the wall for stability. Pt also educated to call for assistance when wanting to walk next to allow for staff assist. Pt verbalized understanding. No needs expressed at this time. Bed in lowest position, call bell in reach. FOB at bedside. 2350 Pt up at bedside changing infant diaper. No needs expressed at this time. 1746 Fundus firm at U-1, scant lochia rubra, no clots present. Pain 6/10, medication administered per protocol. VSS. Pt to feed infant now then rest. Bed in lowest position, call bell in reach. 0330 Assisted pt up to the bathroom/nataly care, pt back to bed. Bed in lowest position, call bell in reach. Pt to rest now. 0430 Medication administered per protocol. Pt resting in bed. No needs expressed at this time. Bed in lowest position. Call bell in reach. 0630 Pt in the bathroom. FOB at bedside with infant. No needs expressed at this time.      0700 Bedside and verbal shift change report given to Marbella Sr RN by Leo Aviles RN. Report given with use of SBAR, Kardex, MAR, I/O, Recent Results.

## 2019-09-27 NOTE — PROGRESS NOTES
Progress Note    Patient: Deepali Lopez MRN: [id number]  SSN: [id number]    YOB: 1990  Age: 26 y.o. Sex: female      Subjective:     Postpartum Day: 2     Delivery: Low transverse  delivery    The patient feels well but teary-eyed. States PMHx Anxiety and is concerned about PP depression. She has taken medication in the past but has not taken medication for her mood during pregnancy, desires to start Zoloft today. She has a therapist and plans to schedule appointment next week. The patient denies desire to harm self or infant. She denies nausea, vomiting, dizziness, or SOB. Pain is  well controlled with current medications. The baby is well. Baby is feeding via breast. Urinary output is adequate. Voiding spontaneous. The patient is ambulating well. The patient is tolerating a normal diet. Flatus has been passed. Objective:      No data found. General:    alert, cooperative, no distress   Bowel Sounds:  active   Lochia:  appropriate   Uterine Fundus:   firm   Fundus Location:  -1   Incision:  no significant drainage, no dehiscence, no significant erythema   DVT Evaluation:  No evidence of DVT seen on physical exam.  Negative Froy's sign. No cords or calf tenderness. +3 calf/ankle edema is present. Lab/Data Review: All lab results for the last 24 hours reviewed. Assessment:     Status post:  Low transverse  delivery, day 2    Plan:     Postpartum care discussed including diet, ambulation, and actvitiy restrictions. Encourage elevation of legs when resting, increase po fluids. PP depression reviewed and ER precautions given. Zoloft 25 mg po daily, patient to follow-up with Hiro Lind therapist next week. Discharge instructions and questions answered for  delivery. Follow appointment with our office in 6 weeks or as needed. DE home tomorrow.

## 2019-09-27 NOTE — PROGRESS NOTES
Care plan made for [facility]:      Problem: Pain  Goal: *Control of Pain  Outcome: Progressing Towards Goal     Problem: Falls - Risk of  Goal: *Absence of Falls  Description  Document Sharita Fred Fall Risk and appropriate interventions in the flowsheet.   Outcome: Progressing Towards Goal  Note:   Fall Risk Interventions:            Medication Interventions: Evaluate medications/consider consulting pharmacy, Teach patient to arise slowly                   Problem:  Delivery: Postpartum Day 2  Goal: Activity/Safety  Outcome: Progressing Towards Goal  Goal: Consults, if ordered  Outcome: Progressing Towards Goal  Goal: Nutrition/Diet  Outcome: Progressing Towards Goal  Goal: Discharge Planning  Outcome: Progressing Towards Goal  Goal: Medications  Outcome: Progressing Towards Goal  Goal: Treatments/Interventions/Procedures  Outcome: Progressing Towards Goal  Goal: Psychosocial  Outcome: Progressing Towards Goal  Goal: *Vital signs within defined limits  Outcome: Progressing Towards Goal  Goal: *Labs within defined limits  Outcome: Progressing Towards Goal  Goal: *Hemodynamically stable  Outcome: Progressing Towards Goal  Goal: *Optimal pain control at patient's stated goal  Outcome: Progressing Towards Goal  Goal: *Participates in infant care  Outcome: Progressing Towards Goal  Goal: *Demonstrates progressive activity  Outcome: Progressing Towards Goal  Goal: *Appropriate parent-infant bonding  Outcome: Progressing Towards Goal  Goal: *Tolerating diet  Outcome: Progressing Towards Goal

## 2019-09-27 NOTE — PROGRESS NOTES
Problem: Pain  Goal: *Control of Pain  Outcome: Progressing Towards Goal     Problem: Patient Education: Go to Patient Education Activity  Goal: Patient/Family Education  Outcome: Progressing Towards Goal     Problem: Patient Education: Go to Patient Education Activity  Goal: Patient/Family Education  Outcome: Progressing Towards Goal     Problem: Falls - Risk of  Goal: *Absence of Falls  Description  Document Ivar Du Fall Risk and appropriate interventions in the flowsheet.   Outcome: Progressing Towards Goal  Note:   Fall Risk Interventions:            Medication Interventions: Patient to call before getting OOB, Teach patient to arise slowly       DANIELE, RN

## 2019-09-28 VITALS
WEIGHT: 223 LBS | SYSTOLIC BLOOD PRESSURE: 113 MMHG | HEART RATE: 101 BPM | DIASTOLIC BLOOD PRESSURE: 79 MMHG | OXYGEN SATURATION: 99 % | RESPIRATION RATE: 17 BRPM | TEMPERATURE: 98.1 F | BODY MASS INDEX: 43.78 KG/M2 | HEIGHT: 60 IN

## 2019-09-28 PROCEDURE — 74011250637 HC RX REV CODE- 250/637: Performed by: ADVANCED PRACTICE MIDWIFE

## 2019-09-28 RX ADMIN — IBUPROFEN 800 MG: 400 TABLET, FILM COATED ORAL at 12:59

## 2019-09-28 RX ADMIN — OXYCODONE HYDROCHLORIDE AND ACETAMINOPHEN 2 TABLET: 5; 325 TABLET ORAL at 08:38

## 2019-09-28 RX ADMIN — OXYCODONE HYDROCHLORIDE AND ACETAMINOPHEN 2 TABLET: 5; 325 TABLET ORAL at 00:56

## 2019-09-28 RX ADMIN — IBUPROFEN 800 MG: 400 TABLET, FILM COATED ORAL at 04:30

## 2019-09-28 RX ADMIN — SERTRALINE HYDROCHLORIDE 25 MG: 50 TABLET ORAL at 08:39

## 2019-09-28 NOTE — PROGRESS NOTES
Problem: Pain  Goal: *Control of Pain  Outcome: Progressing Towards Goal     Problem:  Delivery: Postpartum Day 2  Goal: Nutrition/Diet  Outcome: Progressing Towards Goal  Goal: Medications  Outcome: Progressing Towards Goal  Goal: Psychosocial  Outcome: Progressing Towards Goal  Goal: *Vital signs within defined limits  Outcome: Progressing Towards Goal  Goal: *Participates in infant care  Outcome: Progressing Towards Goal  Goal: *Appropriate parent-infant bonding  Outcome: Progressing Towards Goal   ROSA CHRISTENSEN

## 2019-09-28 NOTE — PROGRESS NOTES
spent 30 mins doing education on mom and baby discharge. Discharge teaching to include \"free  hailey\" provided by hospital for patient to download. Baby care given including but not limited to:  screening of MST, TCB, and Hearing screen tests. Common infant rashes milia, cradle cap. Informed to call pediatrician if baby develops yellowing of the skin or eyes. Discussed care or respiratory system with bulb syring, how to turn baby and burp to clear airway. Recommended infant CPR class. Keep umbilical cord dry, if becomes soiled okay to clean with alcohol pads, cord will fall off in 5-10 days. Bowel and bladder: keep records of infant voids and stools. May use diaper cream if needed, but never powder. SIDS and Shaken baby syndrome discussed. Bathe infant via sponge bath until cord is fallen off and healed. Safe sleeping, car seat education provided. Mother's postpartum care provided to include: Breastfeeding, refrain from pacifier or bottle use for 2-4 weeks until good breast feeding is established, if breast get full and warm, hand express or machine express. Keep feeding infant if engorgement or mastitis occurs or it may get worse. Breast feeding nutrient guide given. Discussed lochia bleeding, call  If there is an increase in bleeding or several clots being passed, any foul odors or abnormal discharge colors. Nothing in the vagina for at least 6 weeks until postpartum visit. Discussed cramping and  Incisional pain. Discussed baby blues, emotional changes, and signs of postpartum depression. Seek medical help ASAP if thoughts of harming self or baby.

## 2019-09-28 NOTE — DISCHARGE INSTRUCTIONS
POST DELIVERY DISCHARGE INSTRUCTIONS    Name: Jayla Barrios  YOB: 1990  Primary Diagnosis: Active Problems:    Congenital hip dysplasia (2019)      Morbid obesity with BMI of 40.0-44.9, adult ([address] (2019)      Postpartum care following  delivery (2019)        General:     Diet/Diet Restrictions:  Eight 8-ounce glasses of fluid daily (water, juices); avoid excessive caffeine intake. Meals/snacks as desired which are high in fiber and carbohydrates and low in fat and cholesterol. Physical Activity / Restrictions / Safety:     Avoid heavy lifting, no more than the baby alone (not the baby in the car seat). For 2-3 weeks; limit use of stairs to 2 times daily for the first week home. No driving for two weeks. Avoid intercourse until you complete your postpartum check. No douching or tampon use. Check with obstetrician before starting or resuming an exercise program.    Call your doctor for the following:     Fever over 100.4 degrees by mouth. Vaginal bleeding that soaks more than 2 pads in an hour for more than one hour or if the discharge starts to smell bad. Red streaks or increased swelling of legs, painful red streaks on your breast.  Foul discharge from your incision or the appearance or tender, red areas around it. If you feel extremely anxious or overwhelmed. If you have thoughts of harming yourself and/or your baby. Pain Management:     Pain Management:   Take Ibuprofen (Advil, Motrin), as directed for pain and use prescription narcotic pain medication as needed. Heating pad to  incision as needed. Follow-Up Care:     Appointment with MD:   Follow-up Appointments   Procedures    FOLLOW UP VISIT Appointment in: 6 Weeks Schedule follow appointment with our office in 6 weeks, call office as needed. Schedule follow appointment with our office in 6 weeks, call office as needed.      Standing Status:   Standing     Number of Occurrences:   1 Order Specific Question:   Appointment in     Answer:   6 Weeks     Telephone number: [phone]      Signed By: Ramesh Brownlee CNM       Patient Education         Section: What to Expect at Home  Your Recovery    A  section, or , is surgery to deliver your baby through a cut, called an incision, that the doctor makes in your lower belly and uterus. You may have some pain in your lower belly and need pain medicine for 1 to 2 weeks. You can expect some vaginal bleeding for several weeks. You will probably need about 6 weeks to fully recover. It is important to take it easy while the incision is healing. Avoid heavy lifting, strenuous activities, or exercises that strain the belly muscles while you are recovering. Ask a family member or friend for help with housework, cooking, and shopping. This care sheet gives you a general idea about how long it will take for you to recover. But each person recovers at a different pace. Follow the steps below to get better as quickly as possible. How can you care for yourself at home? Activity    · Rest when you feel tired. Getting enough sleep will help you recover.     · Try to walk each day. Start by walking a little more than you did the day before. Bit by bit, increase the amount you walk. Walking boosts blood flow and helps prevent pneumonia, constipation, and blood clots.     · Avoid strenuous activities, such as bicycle riding, jogging, weightlifting, and aerobic exercise, for 6 weeks or until your doctor says it is okay.     · Until your doctor says it is okay, do not lift anything heavier than your baby.     · Do not do sit-ups or other exercises that strain the belly muscles for 6 weeks or until your doctor says it is okay.     · Hold a pillow over your incision when you cough or take deep breaths.  This will support your belly and decrease your pain.     · You may shower as usual. Pat the incision dry when you are done.     · You will have some vaginal bleeding. Wear sanitary pads. Do not douche or use tampons until your doctor says it is okay.     · Ask your doctor when you can drive again.     · You will probably need to take at least 6 weeks off work. It depends on the type of work you do and how you feel.     · Ask your doctor when it is okay for you to have sex. Diet    · You can eat your normal diet. If your stomach is upset, try bland, low-fat foods like plain rice, broiled chicken, toast, and yogurt.     · Drink plenty of fluids (unless your doctor tells you not to).     · You may notice that your bowel movements are not regular right after your surgery. This is common. Try to avoid constipation and straining with bowel movements. You may want to take a fiber supplement every day. If you have not had a bowel movement after a couple of days, ask your doctor about taking a mild laxative.     · If you are breastfeeding, limit alcohol. Alcohol can cause a lack of energy and other health problems for the baby when a breastfeeding woman drinks heavily. It can also get in the way of a mom's ability to feed her baby or to care for the child in other ways. There isn't a lot of research about exactly how much alcohol can harm a baby. Having no alcohol is the safest choice for your baby. If you choose to have a drink now and then, have only one drink, and limit the number of occasions that you have a drink. Wait to breastfeed at least 2 hours after you have a drink to reduce the amount of alcohol the baby may get in the milk. Medicines    · Your doctor will tell you if and when you can restart your medicines. He or she will also give you instructions about taking any new medicines.     · If you take blood thinners, such as warfarin (Coumadin), clopidogrel (Plavix), or aspirin, be sure to talk to your doctor. He or she will tell you if and when to start taking those medicines again.  Make sure that you understand exactly what your doctor wants you to do.     · Take pain medicines exactly as directed. ? If the doctor gave you a prescription medicine for pain, take it as prescribed. ? If you are not taking a prescription pain medicine, ask your doctor if you can take an over-the-counter medicine.     · If you think your pain medicine is making you sick to your stomach:  ? Take your medicine after meals (unless your doctor has told you not to). ? Ask your doctor for a different pain medicine.     · If your doctor prescribed antibiotics, take them as directed. Do not stop taking them just because you feel better. You need to take the full course of antibiotics. Incision care    · If you have strips of tape on the incision, leave the tape on for a week or until it falls off.     · Wash the area daily with warm, soapy water, and pat it dry. Don't use hydrogen peroxide or alcohol, which can slow healing. You may cover the area with a gauze bandage if it weeps or rubs against clothing. Change the bandage every day.     · Keep the area clean and dry. Other instructions    · If you breastfeed your baby, you may be more comfortable while you are healing if you place the baby so that he or she is not resting on your belly. Try tucking your baby under your arm, with his or her body along the side you will be feeding on. Support your baby's upper body with your arm. With that hand you can control your baby's head to bring his or her mouth to your breast. This is sometimes called the football hold. Follow-up care is a key part of your treatment and safety. Be sure to make and go to all appointments, and call your doctor if you are having problems. It's also a good idea to know your test results and keep a list of the medicines you take. When should you call for help? Call 911 anytime you think you may need emergency care.  For example, call if:    · You have thoughts of harming yourself, your baby, or another person.     · You passed out (lost consciousness).     · You have chest pain, are short of breath, or cough up blood.     · You have a seizure.    Call your doctor now or seek immediate medical care if:    · You have pain that does not get better after you take pain medicine.     · You have severe vaginal bleeding.     · You are dizzy or lightheaded, or you feel like you may faint.     · You have new or worse pain in your belly or pelvis.     · You have loose stitches, or your incision comes open.     · You have symptoms of infection, such as:  ? Increased pain, swelling, warmth, or redness. ? Red streaks leading from the incision. ? Pus draining from the incision. ? A fever.     · You have symptoms of a blood clot in your leg (called a deep vein thrombosis), such as:  ? Pain in your calf, back of the knee, thigh, or groin. ? Redness and swelling in your leg or groin.     · You have signs of preeclampsia, such as:  ? Sudden swelling of your face, hands, or feet. ? New vision problems (such as dimness, blurring, or seeing spots). ? A severe headache.    Watch closely for changes in your health, and be sure to contact your doctor if:    · You do not get better as expected. Where can you learn more? Go to http://[facility].info/. Enter M806 in the search box to learn more about \" Section: What to Expect at Home. \"  Current as of: May 29, 2019  Content Version: 12.2  © 5455-0483 [employer]. Care instructions adapted under license by [employer] (which disclaims liability or warranty for this information). If you have questions about a medical condition or this instruction, always ask your healthcare professional. [place] [address] any warranty or liability for your use of this information.

## 2021-08-02 NOTE — PROGRESS NOTES
Problem: Patient Education: Go to Patient Education Activity  Goal: Patient/Family Education  Outcome: Progressing Towards Goal     Problem: Vaginal Delivery: Day of Deliver-Laboring  Goal: Off Pathway (Use only if patient is Off Pathway)  Outcome: Progressing Towards Goal  Goal: Activity/Safety  Outcome: Progressing Towards Goal  Goal: Consults, if ordered  Outcome: Progressing Towards Goal  Goal: Diagnostic Test/Procedures  Outcome: Progressing Towards Goal  Goal: Nutrition/Diet  Outcome: Progressing Towards Goal  Goal: Discharge Planning  Outcome: Progressing Towards Goal  Goal: Medications  Outcome: Progressing Towards Goal  Goal: Respiratory  Outcome: Progressing Towards Goal  Goal: Treatments/Interventions/Procedures  Outcome: Progressing Towards Goal  Goal: *Vital signs within defined limits  Outcome: Progressing Towards Goal  Goal: *Labs within defined limits  Outcome: Progressing Towards Goal  Goal: *Hemodynamically stable  Outcome: Progressing Towards Goal  Goal: *Optimal pain control at patient's stated goal  Outcome: Progressing Towards Goal     Problem: Pain  Goal: *Control of Pain  Outcome: Progressing Towards Goal     Problem: Patient Education: Go to Patient Education Activity  Goal: Patient/Family Education  Outcome: Progressing Towards Goal English

## (undated) DEVICE — SUT CHRMC 3-0 36IN V34 BRN --

## (undated) DEVICE — MUCUS TRAP WITH VACUUM BREAKER AND FILTER,10 FR/CH (3.33 MM), CATHETER: Brand: ARGYLE

## (undated) DEVICE — STRAP,POSITIONING,KNEE/BODY,FOAM,4X60": Brand: MEDLINE

## (undated) DEVICE — SUT CHRMC 1 36IN CT1 BRN --

## (undated) DEVICE — REM POLYHESIVE ADULT PATIENT RETURN ELECTRODE: Brand: VALLEYLAB

## (undated) DEVICE — SUT VCRL + 1 36IN CT1 VIO --

## (undated) DEVICE — SPONGE GZ W4XL4IN COT 12 PLY TYP VII WVN C FLD DSGN

## (undated) DEVICE — 3L THIN WALL CAN: Brand: CRD

## (undated) DEVICE — SPONGE LAP 18X18IN STRL -- 5/PK

## (undated) DEVICE — GARMENT,MEDLINE,DVT,INT,CALF,MED, GEN2: Brand: MEDLINE

## (undated) DEVICE — INTENDED FOR TISSUE SEPARATION, AND OTHER PROCEDURES THAT REQUIRE A SHARP SURGICAL BLADE TO PUNCTURE OR CUT.: Brand: BARD-PARKER ® CARBON RIB-BACK BLADES

## (undated) DEVICE — SUTURE CHROMIC GUT SZ 1 L36IN ABSRB BRN L40MM CT 1/2 CIR 915H

## (undated) DEVICE — SUTURE MCRYL SZ 3-0 L27IN ABSRB UD L24MM PS-1 3/8 CIR PRIM Y936H

## (undated) DEVICE — SUTURE CHROMIC GUT SZ 2-0 L36IN ABSRB BRN L36MM CT-1 1/2 923H

## (undated) DEVICE — PACK PROCEDURE SURG BIRTH

## (undated) DEVICE — SUT VCRL + 2-0 36IN CT1 UD --